# Patient Record
Sex: MALE | URBAN - METROPOLITAN AREA
[De-identification: names, ages, dates, MRNs, and addresses within clinical notes are randomized per-mention and may not be internally consistent; named-entity substitution may affect disease eponyms.]

---

## 2021-06-11 ENCOUNTER — OFFICE VISIT (OUTPATIENT)
Dept: URBAN - METROPOLITAN AREA CLINIC 32 | Facility: CLINIC | Age: 31
End: 2021-06-11
Payer: COMMERCIAL

## 2021-06-11 PROCEDURE — 99203 OFFICE O/P NEW LOW 30 MIN: CPT | Performed by: OPTOMETRIST

## 2021-06-11 RX ORDER — TOBRAMYCIN 3 MG/ML
0.3 % SOLUTION/ DROPS OPHTHALMIC
Qty: 5 | Refills: 0 | Status: INACTIVE
Start: 2021-06-11 | End: 2021-07-16

## 2021-06-11 RX ORDER — LOTEPREDNOL ETABONATE AND TOBRAMYCIN 5; 3 MG/ML; MG/ML
SUSPENSION/ DROPS OPHTHALMIC
Qty: 5 | Refills: 6 | Status: INACTIVE
Start: 2021-06-11 | End: 2021-06-11

## 2021-06-11 ASSESSMENT — INTRAOCULAR PRESSURE: OD: 15

## 2021-06-11 NOTE — IMPRESSION/PLAN
Impression: Anophthalmos: Q11.1. Plan: Patient needs a prosthetic eye due to the shrinking socket and atrophy of lids and surrounding tissue. Recommend a scleral shell with the possibility of socket and lid expansion with progressively larger prosthetic eyes.  11.00 3.25 see photo([place]) 2020 RTC 6 weeks PRDS

## 2021-07-16 ENCOUNTER — OFFICE VISIT (OUTPATIENT)
Dept: URBAN - METROPOLITAN AREA CLINIC 32 | Facility: CLINIC | Age: 31
End: 2021-07-16
Payer: COMMERCIAL

## 2021-07-16 PROCEDURE — V2627 SCLERAL COVER SHELL: HCPCS | Performed by: OPTOMETRIST

## 2021-07-16 PROCEDURE — 99213 OFFICE O/P EST LOW 20 MIN: CPT | Performed by: OPTOMETRIST

## 2021-10-22 ENCOUNTER — OFFICE VISIT (OUTPATIENT)
Dept: URBAN - METROPOLITAN AREA CLINIC 32 | Facility: CLINIC | Age: 31
End: 2021-10-22
Payer: COMMERCIAL

## 2021-10-22 DIAGNOSIS — Q11.1 ANOPHTHALMOS: Primary | ICD-10-CM

## 2021-10-22 PROCEDURE — V2624 POLISHING ARTIFICAL EYE: HCPCS | Performed by: OPTOMETRIST

## 2021-10-22 PROCEDURE — 99213 OFFICE O/P EST LOW 20 MIN: CPT | Performed by: OPTOMETRIST

## 2021-10-22 ASSESSMENT — INTRAOCULAR PRESSURE: OD: 13

## 2021-10-22 NOTE — IMPRESSION/PLAN
Impression: Anophthalmos: Q11.1. Plan: Patient needs a prosthetic eye due to the shrinking socket and atrophy of lids and surrounding tissue. Recommend a scleral shell with the possibility of socket and lid expansion with progressively larger prosthetic eyes. Polished prosthetic eye today, return to clinic in 3 months for follow-up and polish.

## 2021-12-03 ENCOUNTER — OFFICE VISIT (OUTPATIENT)
Dept: URBAN - METROPOLITAN AREA CLINIC 32 | Facility: CLINIC | Age: 31
End: 2021-12-03
Payer: COMMERCIAL

## 2021-12-03 PROCEDURE — 99213 OFFICE O/P EST LOW 20 MIN: CPT | Performed by: OPTOMETRIST

## 2021-12-03 ASSESSMENT — INTRAOCULAR PRESSURE: OD: 15

## 2021-12-03 NOTE — IMPRESSION/PLAN
Impression: Anophthalmos: Q11.1. Plan: Irritation and Discharge due to build up on the prosthesis and mechanical irritation. Cleaned and polished prosthesis today. Use Artificial Tears PRN. Pataday BID OU PRN. Return to Clinic in 3 months for PRFU and further evaluation and management.

## 2023-10-26 ENCOUNTER — OFFICE VISIT (OUTPATIENT)
Dept: URBAN - METROPOLITAN AREA CLINIC 32 | Facility: CLINIC | Age: 33
End: 2023-10-26
Payer: COMMERCIAL

## 2023-10-26 DIAGNOSIS — Q11.1 ANOPHTHALMOS: Primary | ICD-10-CM

## 2023-10-26 PROCEDURE — V2628 FABRICATION & FITTING: HCPCS | Performed by: OPTOMETRIST

## 2023-10-26 PROCEDURE — 99213 OFFICE O/P EST LOW 20 MIN: CPT | Performed by: OPTOMETRIST

## 2023-10-26 ASSESSMENT — INTRAOCULAR PRESSURE: OD: 12

## 2023-12-08 ENCOUNTER — OFFICE VISIT (OUTPATIENT)
Dept: URBAN - METROPOLITAN AREA CLINIC 32 | Facility: CLINIC | Age: 33
End: 2023-12-08
Payer: COMMERCIAL

## 2023-12-08 DIAGNOSIS — Q11.1 ANOPHTHALMOS: Primary | ICD-10-CM

## 2023-12-08 PROCEDURE — V2627 SCLERAL COVER SHELL: HCPCS | Performed by: OPTOMETRIST

## 2023-12-08 PROCEDURE — 99213 OFFICE O/P EST LOW 20 MIN: CPT | Performed by: OPTOMETRIST

## 2023-12-08 ASSESSMENT — INTRAOCULAR PRESSURE: OD: 10
